# Patient Record
Sex: FEMALE | Race: WHITE | URBAN - METROPOLITAN AREA
[De-identification: names, ages, dates, MRNs, and addresses within clinical notes are randomized per-mention and may not be internally consistent; named-entity substitution may affect disease eponyms.]

---

## 2024-02-20 LAB
ALBUMIN SERPL-MCNC: NORMAL G/DL
ALP BLD-CCNC: NORMAL U/L
ALT SERPL-CCNC: NORMAL U/L
ANION GAP SERPL CALCULATED.3IONS-SCNC: NORMAL MMOL/L
AST SERPL-CCNC: NORMAL U/L
BASOPHILS ABSOLUTE: NORMAL
BASOPHILS RELATIVE PERCENT: NORMAL
BILIRUB SERPL-MCNC: NORMAL MG/DL
BUN BLDV-MCNC: NORMAL MG/DL
CALCIUM SERPL-MCNC: NORMAL MG/DL
CHLORIDE BLD-SCNC: NORMAL MMOL/L
CHOLESTEROL, TOTAL: 191 MG/DL
CHOLESTEROL/HDL RATIO: NORMAL
CO2: NORMAL
CREAT SERPL-MCNC: NORMAL MG/DL
EGFR: NORMAL
EOSINOPHILS ABSOLUTE: NORMAL
EOSINOPHILS RELATIVE PERCENT: NORMAL
GLUCOSE BLD-MCNC: NORMAL MG/DL
HCT VFR BLD CALC: NORMAL %
HDLC SERPL-MCNC: 46 MG/DL (ref 35–70)
HEMOGLOBIN: NORMAL
LDL CHOLESTEROL CALCULATED: 122 MG/DL (ref 0–160)
LYMPHOCYTES ABSOLUTE: NORMAL
LYMPHOCYTES RELATIVE PERCENT: NORMAL
MCH RBC QN AUTO: NORMAL PG
MCHC RBC AUTO-ENTMCNC: NORMAL G/DL
MCV RBC AUTO: NORMAL FL
MONOCYTES ABSOLUTE: NORMAL
MONOCYTES RELATIVE PERCENT: NORMAL
NEUTROPHILS ABSOLUTE: NORMAL
NEUTROPHILS RELATIVE PERCENT: NORMAL
NONHDLC SERPL-MCNC: NORMAL MG/DL
PLATELET # BLD: NORMAL 10*3/UL
PMV BLD AUTO: NORMAL FL
POTASSIUM SERPL-SCNC: NORMAL MMOL/L
PROLACTIN: 25.9
RBC # BLD: NORMAL 10*6/UL
SODIUM BLD-SCNC: NORMAL MMOL/L
TOTAL PROTEIN: NORMAL
TRIGL SERPL-MCNC: 131 MG/DL
VLDLC SERPL CALC-MCNC: 23 MG/DL
WBC # BLD: NORMAL 10*3/UL

## 2024-02-25 SDOH — HEALTH STABILITY: PHYSICAL HEALTH: ON AVERAGE, HOW MANY DAYS PER WEEK DO YOU ENGAGE IN MODERATE TO STRENUOUS EXERCISE (LIKE A BRISK WALK)?: 2 DAYS

## 2024-02-25 SDOH — HEALTH STABILITY: PHYSICAL HEALTH: ON AVERAGE, HOW MANY MINUTES DO YOU ENGAGE IN EXERCISE AT THIS LEVEL?: 30 MIN

## 2024-02-26 ENCOUNTER — OFFICE VISIT (OUTPATIENT)
Dept: PRIMARY CARE CLINIC | Age: 24
End: 2024-02-26
Payer: COMMERCIAL

## 2024-02-26 VITALS
SYSTOLIC BLOOD PRESSURE: 110 MMHG | RESPIRATION RATE: 16 BRPM | BODY MASS INDEX: 48.14 KG/M2 | OXYGEN SATURATION: 98 % | WEIGHT: 282 LBS | TEMPERATURE: 98.2 F | HEART RATE: 82 BPM | HEIGHT: 64 IN | DIASTOLIC BLOOD PRESSURE: 80 MMHG

## 2024-02-26 DIAGNOSIS — F41.1 GAD (GENERALIZED ANXIETY DISORDER): ICD-10-CM

## 2024-02-26 DIAGNOSIS — R74.8 ELEVATED LIVER ENZYMES: Primary | ICD-10-CM

## 2024-02-26 PROCEDURE — 1036F TOBACCO NON-USER: CPT | Performed by: FAMILY MEDICINE

## 2024-02-26 PROCEDURE — 99203 OFFICE O/P NEW LOW 30 MIN: CPT | Performed by: FAMILY MEDICINE

## 2024-02-26 PROCEDURE — G8419 CALC BMI OUT NRM PARAM NOF/U: HCPCS | Performed by: FAMILY MEDICINE

## 2024-02-26 PROCEDURE — G8427 DOCREV CUR MEDS BY ELIG CLIN: HCPCS | Performed by: FAMILY MEDICINE

## 2024-02-26 PROCEDURE — G8484 FLU IMMUNIZE NO ADMIN: HCPCS | Performed by: FAMILY MEDICINE

## 2024-02-26 SDOH — ECONOMIC STABILITY: HOUSING INSECURITY
IN THE LAST 12 MONTHS, WAS THERE A TIME WHEN YOU DID NOT HAVE A STEADY PLACE TO SLEEP OR SLEPT IN A SHELTER (INCLUDING NOW)?: NO

## 2024-02-26 SDOH — ECONOMIC STABILITY: FOOD INSECURITY: WITHIN THE PAST 12 MONTHS, YOU WORRIED THAT YOUR FOOD WOULD RUN OUT BEFORE YOU GOT MONEY TO BUY MORE.: NEVER TRUE

## 2024-02-26 SDOH — ECONOMIC STABILITY: INCOME INSECURITY: HOW HARD IS IT FOR YOU TO PAY FOR THE VERY BASICS LIKE FOOD, HOUSING, MEDICAL CARE, AND HEATING?: NOT HARD AT ALL

## 2024-02-26 SDOH — ECONOMIC STABILITY: FOOD INSECURITY: WITHIN THE PAST 12 MONTHS, THE FOOD YOU BOUGHT JUST DIDN'T LAST AND YOU DIDN'T HAVE MONEY TO GET MORE.: NEVER TRUE

## 2024-02-26 ASSESSMENT — PATIENT HEALTH QUESTIONNAIRE - PHQ9
2. FEELING DOWN, DEPRESSED OR HOPELESS: 0
SUM OF ALL RESPONSES TO PHQ QUESTIONS 1-9: 0
1. LITTLE INTEREST OR PLEASURE IN DOING THINGS: 0
SUM OF ALL RESPONSES TO PHQ QUESTIONS 1-9: 0
SUM OF ALL RESPONSES TO PHQ9 QUESTIONS 1 & 2: 0
SUM OF ALL RESPONSES TO PHQ QUESTIONS 1-9: 0
SUM OF ALL RESPONSES TO PHQ QUESTIONS 1-9: 0

## 2024-02-26 ASSESSMENT — ENCOUNTER SYMPTOMS: ABDOMINAL PAIN: 0

## 2024-02-26 NOTE — PROGRESS NOTES
Akiko Perez, a female of 24 y.o. came to the office 2/26/2024.     There is no problem list on file for this patient.         Anxiety  Presents for follow-up (on [facility] 10/2022) visit. Patient reports no decreased concentration, depressed mood, excessive worry, insomnia, irritability, nervous/anxious behavior or panic.        elevated liver enzymes: no hz of this.         No Known Allergies    Current Outpatient Medications on File Prior to Visit   Medication Sig Dispense Refill    FLUOXETINE HCL PO Take 40 mg by mouth daily       No current facility-administered medications on file prior to visit.       Review of Systems   Constitutional:  Negative for activity change, appetite change, chills, fatigue, fever and irritability.   Gastrointestinal:  Negative for abdominal pain.   Skin:  Negative for rash.   Psychiatric/Behavioral:  Negative for agitation, decreased concentration, dysphoric mood and sleep disturbance. The patient is not nervous/anxious and does not have insomnia.      other review of systems reviewed and are negative    OBJECTIVE:  /80   Pulse 82   Temp 98.2 °F (36.8 °C)   Resp 16   Ht 1.626 m (5' 4\")   Wt 127.9 kg (282 lb)   LMP 01/07/2024   SpO2 98%   BMI 48.41 kg/m²      Physical Exam  Vitals reviewed.   Eyes:      General: No scleral icterus.     Conjunctiva/sclera: Conjunctivae normal.   Neck:      Thyroid: No thyromegaly.   Cardiovascular:      Rate and Rhythm: Normal rate and regular rhythm.      Heart sounds: No murmur heard.  Pulmonary:      Effort: Pulmonary effort is normal.      Breath sounds: Normal breath sounds. No wheezing or rales.   Abdominal:      General: Bowel sounds are normal.      Palpations: Abdomen is soft. There is no hepatomegaly, splenomegaly or mass.      Tenderness: There is no abdominal tenderness. There is no guarding or rebound.   Musculoskeletal:         General: Normal range of motion.      Cervical back: Neck supple.   Lymphadenopathy:      Cervical:

## 2024-03-11 ENCOUNTER — HOSPITAL ENCOUNTER (OUTPATIENT)
Age: 24
Discharge: HOME | End: 2024-03-11
Payer: COMMERCIAL

## 2024-03-11 DIAGNOSIS — R74.8 ELEVATED LIVER ENZYMES: ICD-10-CM

## 2024-03-11 LAB
ALBUMIN SERPL-MCNC: 4.5 G/DL (ref 3.5–5.2)
ALP SERPL-CCNC: 74 U/L (ref 35–104)
ALT SERPL-CCNC: 121 U/L (ref 0–32)
AST SERPL-CCNC: 60 U/L (ref 0–31)
BILIRUB DIRECT SERPL-MCNC: <0.2 MG/DL (ref 0–0.3)
BILIRUB INDIRECT SERPL-MCNC: ABNORMAL MG/DL (ref 0–1)
BILIRUB SERPL-MCNC: 0.7 MG/DL (ref 0–1.2)
PROT SERPL-MCNC: 8.4 G/DL (ref 6.4–8.3)

## 2024-03-11 PROCEDURE — 80074 ACUTE HEPATITIS PANEL: CPT

## 2024-03-11 PROCEDURE — 36415 COLL VENOUS BLD VENIPUNCTURE: CPT

## 2024-03-11 PROCEDURE — 83525 ASSAY OF INSULIN: CPT

## 2024-03-11 PROCEDURE — 80076 HEPATIC FUNCTION PANEL: CPT

## 2024-03-12 LAB
HAV IGM SERPL QL IA: NONREACTIVE
HBV CORE IGM SERPL QL IA: NONREACTIVE
HBV SURFACE AG SERPL QL IA: NONREACTIVE
HCV AB SERPL QL IA: NONREACTIVE

## 2024-03-13 ENCOUNTER — TELEPHONE (OUTPATIENT)
Dept: PRIMARY CARE CLINIC | Age: 24
End: 2024-03-13

## 2024-03-13 DIAGNOSIS — R74.8 ELEVATED LIVER ENZYMES: Primary | ICD-10-CM

## 2024-03-13 LAB
INSULIN REFERENCE RANGE:: NORMAL
INSULIN: 23.7 MU/L

## 2024-03-13 NOTE — TELEPHONE ENCOUNTER
Left message that liver enzymes still slightly elevated but her hepatitis panel was negative.  Insulin level normal.  At this time I would recommend getting an ultrasound of her liver if she would agree.  Let us know and I can order that.

## 2024-03-25 ENCOUNTER — TELEPHONE (OUTPATIENT)
Dept: PRIMARY CARE CLINIC | Age: 24
End: 2024-03-25

## 2024-03-25 RX ORDER — TIRZEPATIDE 2.5 MG/.5ML
2.5 INJECTION, SOLUTION SUBCUTANEOUS WEEKLY
Qty: 2 ML | Refills: 1 | Status: SHIPPED | OUTPATIENT
Start: 2024-03-25

## 2024-03-25 NOTE — TELEPHONE ENCOUNTER
Pt called today, requested scrip for Zepbound. Said PA was approved for , and they have the same insurance.    SUSHIL Whitmoret

## 2024-03-27 ENCOUNTER — HOSPITAL ENCOUNTER (OUTPATIENT)
Dept: ULTRASOUND IMAGING | Age: 24
Discharge: HOME | End: 2024-03-29
Attending: FAMILY MEDICINE
Payer: COMMERCIAL

## 2024-03-27 ENCOUNTER — TELEPHONE (OUTPATIENT)
Dept: PRIMARY CARE CLINIC | Age: 24
End: 2024-03-27

## 2024-03-27 DIAGNOSIS — R74.8 ELEVATED LIVER ENZYMES: ICD-10-CM

## 2024-03-27 PROCEDURE — 76705 ECHO EXAM OF ABDOMEN: CPT

## 2024-03-27 NOTE — TELEPHONE ENCOUNTER
----- Message from Ina Mancia sent at 3/27/2024  9:19 AM EDT -----  Subject: Message to Provider    QUESTIONS  Information for Provider? Patient stats that Dr. Lanza told her to get   a ultrasound of her liver, when she got to the [facility] they   told her it was a ultrasound of her gall balder. She would like for   someone to look at the referral and check if that was correct or if she   needs to go back and do another ultrasound.  ---------------------------------------------------------------------------  --------------  CALL BACK INFO  [phone]; OK to leave message on voicemail  ---------------------------------------------------------------------------  --------------  SCRIPT ANSWERS  Relationship to Patient? Self

## 2024-04-01 ENCOUNTER — TELEPHONE (OUTPATIENT)
Dept: PRIMARY CARE CLINIC | Age: 24
End: 2024-04-01

## 2024-04-01 DIAGNOSIS — R74.8 ELEVATED LIVER ENZYMES: Primary | ICD-10-CM

## 2024-04-01 NOTE — TELEPHONE ENCOUNTER
Spoke with patient we will recheck her liver enzymes in 6 weeks.  Her gallbladder/liver ultrasound showed normal liver but sludge in her gallbladder.  She denies any right upper quadrant pain.  Follow-up based on abnormal labs or if she gets any right upper quadrant pain.

## 2024-07-24 RX ORDER — TIRZEPATIDE 2.5 MG/.5ML
2.5 INJECTION, SOLUTION SUBCUTANEOUS WEEKLY
Qty: 2 ML | Refills: 0 | Status: SHIPPED | OUTPATIENT
Start: 2024-07-24

## 2024-08-22 ENCOUNTER — OFFICE VISIT (OUTPATIENT)
Dept: PRIMARY CARE CLINIC | Age: 24
End: 2024-08-22
Payer: COMMERCIAL

## 2024-08-22 VITALS
HEART RATE: 81 BPM | TEMPERATURE: 97.7 F | WEIGHT: 276 LBS | SYSTOLIC BLOOD PRESSURE: 124 MMHG | DIASTOLIC BLOOD PRESSURE: 72 MMHG | BODY MASS INDEX: 47.38 KG/M2 | OXYGEN SATURATION: 98 %

## 2024-08-22 DIAGNOSIS — R74.8 ELEVATED LIVER ENZYMES: ICD-10-CM

## 2024-08-22 DIAGNOSIS — F41.1 GAD (GENERALIZED ANXIETY DISORDER): Primary | ICD-10-CM

## 2024-08-22 DIAGNOSIS — E66.01 CLASS 3 SEVERE OBESITY DUE TO EXCESS CALORIES WITH BODY MASS INDEX (BMI) OF 45.0 TO 49.9 IN ADULT, UNSPECIFIED WHETHER SERIOUS COMORBIDITY PRESENT (HCC): ICD-10-CM

## 2024-08-22 PROCEDURE — 99213 OFFICE O/P EST LOW 20 MIN: CPT | Performed by: FAMILY MEDICINE

## 2024-08-22 RX ORDER — FLUOXETINE HYDROCHLORIDE 40 MG/1
40 CAPSULE ORAL DAILY
Qty: 90 CAPSULE | Refills: 1 | Status: SHIPPED | OUTPATIENT
Start: 2024-08-22

## 2024-08-22 RX ORDER — TIRZEPATIDE 5 MG/.5ML
5 INJECTION, SOLUTION SUBCUTANEOUS WEEKLY
Qty: 2 ML | Refills: 2 | Status: SHIPPED | OUTPATIENT
Start: 2024-08-22

## 2024-08-22 ASSESSMENT — ENCOUNTER SYMPTOMS
VOMITING: 0
ABDOMINAL PAIN: 0
NAUSEA: 0

## 2024-08-22 NOTE — PROGRESS NOTES
Akiko Perez, a female of 24 y.o. came to the office 8/22/2024.     There is no problem list on file for this patient.         Anxiety  Presents for follow-up (moods doing very good.  increase in past of Prozac to 40 mg helped alot.) visit. Patient reports no decreased concentration, depressed mood, excessive worry, insomnia, irritability, nausea, nervous/anxious behavior, obsessions or panic. The quality of sleep is poor.     Compliance with medications is %.   Weight Management  This is a new (on Zepbound 2.5 mg for mo's.) problem. The current episode started more than 1 year ago. The problem has been gradually improving (first half of wk after injection but then half gets food noise.). Pertinent negatives include no abdominal pain, fatigue, nausea or vomiting. Treatments tried: Zepbound glp 1. The treatment provided mild relief.        No Known Allergies    No current outpatient medications on file prior to visit.     No current facility-administered medications on file prior to visit.       Review of Systems   Constitutional:  Negative for activity change, appetite change, fatigue and irritability.   Gastrointestinal:  Negative for abdominal pain, nausea and vomiting.   Psychiatric/Behavioral:  Negative for agitation, decreased concentration, dysphoric mood and sleep disturbance. The patient is not nervous/anxious and does not have insomnia.      other review of systems reviewed and are negative    OBJECTIVE:  /72   Pulse 81   Temp 97.7 °F (36.5 °C)   Wt 125.2 kg (276 lb)   SpO2 98%   BMI 47.38 kg/m²      Physical Exam  Vitals reviewed.   Constitutional:       General: She is not in acute distress.  Eyes:      General: No scleral icterus.     Conjunctiva/sclera: Conjunctivae normal.   Neck:      Thyroid: No thyromegaly.   Cardiovascular:      Rate and Rhythm: Normal rate and regular rhythm.      Heart sounds: No murmur heard.  Pulmonary:      Effort: Pulmonary effort is normal.      Breath sounds:

## 2024-09-25 ENCOUNTER — OFFICE VISIT (OUTPATIENT)
Dept: PRIMARY CARE CLINIC | Age: 24
End: 2024-09-25
Payer: COMMERCIAL

## 2024-09-25 VITALS
WEIGHT: 279 LBS | OXYGEN SATURATION: 98 % | BODY MASS INDEX: 47.89 KG/M2 | DIASTOLIC BLOOD PRESSURE: 78 MMHG | HEART RATE: 77 BPM | TEMPERATURE: 98.7 F | SYSTOLIC BLOOD PRESSURE: 124 MMHG

## 2024-09-25 DIAGNOSIS — L91.8 CUTANEOUS SKIN TAGS: Primary | ICD-10-CM

## 2024-09-25 PROCEDURE — 99213 OFFICE O/P EST LOW 20 MIN: CPT | Performed by: FAMILY MEDICINE

## 2024-09-25 ASSESSMENT — ENCOUNTER SYMPTOMS: ROS SKIN COMMENTS: SEE HPI

## 2024-10-25 ENCOUNTER — OFFICE VISIT (OUTPATIENT)
Dept: PRIMARY CARE CLINIC | Age: 24
End: 2024-10-25
Payer: COMMERCIAL

## 2024-10-25 VITALS
HEART RATE: 118 BPM | SYSTOLIC BLOOD PRESSURE: 134 MMHG | TEMPERATURE: 99.8 F | WEIGHT: 271 LBS | BODY MASS INDEX: 46.26 KG/M2 | HEIGHT: 64 IN | DIASTOLIC BLOOD PRESSURE: 80 MMHG

## 2024-10-25 DIAGNOSIS — J06.9 ACUTE URI: Primary | ICD-10-CM

## 2024-10-25 DIAGNOSIS — J02.9 SORE THROAT: ICD-10-CM

## 2024-10-25 LAB
INFLUENZA A ANTIGEN, POC: NEGATIVE
INFLUENZA B ANTIGEN, POC: NEGATIVE
LOT EXPIRE DATE: NORMAL
LOT KIT NUMBER: NORMAL
S PYO AG THROAT QL: NORMAL
SARS-COV-2, POC: NORMAL
VALID INTERNAL CONTROL: NORMAL
VENDOR AND KIT NAME POC: NORMAL

## 2024-10-25 PROCEDURE — 99213 OFFICE O/P EST LOW 20 MIN: CPT | Performed by: NURSE PRACTITIONER

## 2024-10-25 PROCEDURE — 87428 SARSCOV & INF VIR A&B AG IA: CPT | Performed by: NURSE PRACTITIONER

## 2024-10-25 PROCEDURE — 87880 STREP A ASSAY W/OPTIC: CPT | Performed by: NURSE PRACTITIONER

## 2024-10-25 RX ORDER — PREDNISONE 10 MG/1
10 TABLET ORAL 2 TIMES DAILY
Qty: 10 TABLET | Refills: 0 | Status: SHIPPED | OUTPATIENT
Start: 2024-10-25 | End: 2024-10-30

## 2024-10-25 RX ORDER — AZITHROMYCIN 250 MG/1
TABLET, FILM COATED ORAL
Qty: 6 TABLET | Refills: 0 | Status: SHIPPED | OUTPATIENT
Start: 2024-10-25 | End: 2024-11-04

## 2024-10-25 NOTE — PROGRESS NOTES
Chief Complaint:   Fever (Since yesterday ), Headache, Fatigue, and Pharyngitis      History of Present Illness   Source of history provided by:  patient.      Akiko Perez is a 24 y.o. old female with a past medical history of:   Past Medical History:   Diagnosis Date    AMBROCIO (generalized anxiety disorder)     PCOS (polycystic ovarian syndrome) 2019    Blum        Pt presents to the [facility] with congestion/sore throat/headaches and fevers for the past 1-2 days.    Temp 99.8 today. Pt is tolerating Po well.  Denies any N/V/D, difficulty swallowing, rashes, abdominal pain, CP, progressive SOB, dizziness, or lethargy.           ROS    Unless otherwise stated in this report or unable to obtain because of the patient's clinical or mental status as evidenced by the medical record, this patients's positive and negative responses for Review of Systems, constitutional, psych, eyes, ENT, cardiovascular, respiratory, gastrointestinal, neurological, genitourinary, musculoskeletal, integument systems and systems related to the presenting problem are either stated in the preceding or were not pertinent or were negative for the symptoms and/or complaints related to the medical problem.    Past Surgical History:  has no past surgical history on file.  Social History:  reports that she has never smoked. She has never been exposed to tobacco smoke. She has never used smokeless tobacco. She reports that she does not drink alcohol and does not use drugs.  Family History: family history includes Osteoarthritis in her mother; Other in her mother; Rheum Arthritis in her mother; Stroke in her maternal grandmother.   Allergies: Patient has no known allergies.    Physical Exam         VS:  /80   Pulse (!) 118   Temp 99.8 °F (37.7 °C)   Ht 1.626 m (5' 4\")   Wt 122.9 kg (271 lb)   BMI 46.52 kg/m²    Oxygen Saturation Interpretation: Normal.    Constitutional:  Alert, development consistent with age.  Ears:  External Ears: Normal

## 2025-02-26 SDOH — ECONOMIC STABILITY: FOOD INSECURITY: WITHIN THE PAST 12 MONTHS, YOU WORRIED THAT YOUR FOOD WOULD RUN OUT BEFORE YOU GOT MONEY TO BUY MORE.: NEVER TRUE

## 2025-02-26 SDOH — ECONOMIC STABILITY: TRANSPORTATION INSECURITY
IN THE PAST 12 MONTHS, HAS THE LACK OF TRANSPORTATION KEPT YOU FROM MEDICAL APPOINTMENTS OR FROM GETTING MEDICATIONS?: NO

## 2025-02-26 SDOH — ECONOMIC STABILITY: FOOD INSECURITY: WITHIN THE PAST 12 MONTHS, THE FOOD YOU BOUGHT JUST DIDN'T LAST AND YOU DIDN'T HAVE MONEY TO GET MORE.: NEVER TRUE

## 2025-02-26 SDOH — ECONOMIC STABILITY: INCOME INSECURITY: IN THE LAST 12 MONTHS, WAS THERE A TIME WHEN YOU WERE NOT ABLE TO PAY THE MORTGAGE OR RENT ON TIME?: NO

## 2025-02-26 SDOH — ECONOMIC STABILITY: TRANSPORTATION INSECURITY
IN THE PAST 12 MONTHS, HAS LACK OF TRANSPORTATION KEPT YOU FROM MEETINGS, WORK, OR FROM GETTING THINGS NEEDED FOR DAILY LIVING?: NO

## 2025-02-26 ASSESSMENT — PATIENT HEALTH QUESTIONNAIRE - PHQ9
2. FEELING DOWN, DEPRESSED OR HOPELESS: NOT AT ALL
SUM OF ALL RESPONSES TO PHQ9 QUESTIONS 1 & 2: 0
SUM OF ALL RESPONSES TO PHQ QUESTIONS 1-9: 0
2. FEELING DOWN, DEPRESSED OR HOPELESS: NOT AT ALL
SUM OF ALL RESPONSES TO PHQ QUESTIONS 1-9: 0
1. LITTLE INTEREST OR PLEASURE IN DOING THINGS: NOT AT ALL
SUM OF ALL RESPONSES TO PHQ QUESTIONS 1-9: 0
SUM OF ALL RESPONSES TO PHQ QUESTIONS 1-9: 0
1. LITTLE INTEREST OR PLEASURE IN DOING THINGS: NOT AT ALL
SUM OF ALL RESPONSES TO PHQ9 QUESTIONS 1 & 2: 0

## 2025-02-27 ENCOUNTER — OFFICE VISIT (OUTPATIENT)
Dept: PRIMARY CARE CLINIC | Age: 25
End: 2025-02-27

## 2025-02-27 VITALS
SYSTOLIC BLOOD PRESSURE: 116 MMHG | HEIGHT: 64 IN | HEART RATE: 88 BPM | OXYGEN SATURATION: 97 % | WEIGHT: 239.5 LBS | TEMPERATURE: 97.3 F | BODY MASS INDEX: 40.89 KG/M2 | DIASTOLIC BLOOD PRESSURE: 78 MMHG

## 2025-02-27 DIAGNOSIS — F41.1 GAD (GENERALIZED ANXIETY DISORDER): ICD-10-CM

## 2025-02-27 RX ORDER — FLUOXETINE HYDROCHLORIDE 40 MG/1
40 CAPSULE ORAL DAILY
Qty: 90 CAPSULE | Refills: 1 | Status: SHIPPED | OUTPATIENT
Start: 2025-02-27

## 2025-02-27 NOTE — PROGRESS NOTES
Akiko Perez, a female of 25 y.o. came to the office 2/27/2025.     There is no problem list on file for this patient.         Anxiety  Presents for follow-up (doing well on Prozac.) visit. Patient reports no decreased concentration, depressed mood, excessive worry, insomnia, irritability, nervous/anxious behavior, obsessions or panic. Symptoms occur rarely.        Obesity: only on Zepbound for 3-4 months. Now dieting and going to gym several times a wk.    No Known Allergies    No current outpatient medications on file prior to visit.     No current facility-administered medications on file prior to visit.       Review of Systems   Constitutional:  Negative for irritability.   Psychiatric/Behavioral:  Negative for decreased concentration. The patient is not nervous/anxious and does not have insomnia.      other review of systems reviewed and are negative    OBJECTIVE:  /78   Pulse 88   Temp 97.3 °F (36.3 °C)   Ht 1.626 m (5' 4\")   Wt 108.6 kg (239 lb 8 oz)   SpO2 97%   BMI 41.11 kg/m²      Physical Exam  Vitals reviewed.   Eyes:      General: No scleral icterus.     Conjunctiva/sclera: Conjunctivae normal.   Neck:      Thyroid: No thyromegaly.      Vascular: No carotid bruit.   Cardiovascular:      Rate and Rhythm: Normal rate and regular rhythm.      Heart sounds: No murmur heard.  Pulmonary:      Effort: Pulmonary effort is normal.      Breath sounds: Normal breath sounds. No wheezing or rales.   Musculoskeletal:         General: Normal range of motion.      Cervical back: Neck supple.   Lymphadenopathy:      Cervical: No cervical adenopathy.   Skin:     General: Skin is warm and dry.   Neurological:      Mental Status: She is alert and oriented to person, place, and time.     Wt: down 43 lbs in 1 yr.    ASSESSMENT AND PLAN:    Aikko was seen today for 6 month follow-up.    Diagnoses and all orders for this visit:    AMBROCIO (generalized anxiety disorder)  -     FLUoxetine (PROZAC) 40 MG capsule; Take 1